# Patient Record
Sex: FEMALE | Race: WHITE | URBAN - METROPOLITAN AREA
[De-identification: names, ages, dates, MRNs, and addresses within clinical notes are randomized per-mention and may not be internally consistent; named-entity substitution may affect disease eponyms.]

---

## 2018-01-16 ENCOUNTER — HOSPITAL ENCOUNTER (OUTPATIENT)
Dept: PHYSICAL THERAPY | Age: 83
Discharge: HOME | End: 2018-01-31

## 2018-01-16 ASSESSMENT — PAIN DESCRIPTION - PROGRESSION: CLINICAL_PROGRESSION: NOT CHANGED

## 2018-01-16 ASSESSMENT — PAIN DESCRIPTION - FREQUENCY: FREQUENCY: INTERMITTENT

## 2018-01-16 ASSESSMENT — PAIN DESCRIPTION - ORIENTATION: ORIENTATION: LEFT

## 2018-01-16 ASSESSMENT — PAIN SCALES - GENERAL: PAINLEVEL_OUTOF10: 5

## 2018-01-16 ASSESSMENT — PAIN DESCRIPTION - PAIN TYPE: TYPE: CHRONIC PAIN

## 2018-01-16 ASSESSMENT — PAIN DESCRIPTION - LOCATION: LOCATION: KNEE

## 2018-01-16 NOTE — PROGRESS NOTES
impaired function, dec strength, dec safety  Prognosis: Good;Fair  Decision Making: Medium Complexity  History: PMH:  Including, but not limited to, OA, CVA (recent) and report of TIAs per son, A-fib, HTN, Lt knee OA  Exam: transfers, gait, LEFS, MMT  Clinical Presentation: moderate complexity - evolving  Patient Education: See daily note  Barriers to Learning: None noted today  REQUIRES PT FOLLOW UP: Yes  Treatment Initiated : See daily note  Discharge Recommendations: Continue to assess pending progress  Activity Tolerance  Activity Tolerance: Patient limited by fatigue  Activity Tolerance: Mildly SOB during gait (after approx 60 feet) requiring a standing rest break (chronic condition per pt)         Plan   Plan  Times per week: 1x/wk  Plan weeks: 6 weeks  Current Treatment Recommendations: Strengthening, ROM, Balance Training, Transfer Training, Functional Mobility Training, Stair training, Gait Training, Endurance Training, Manual Therapy - Soft Tissue Mobilization, Pain Management, Modalities, Patient/Caregiver Education & Training, Safety Education & Training, Home Exercise Program  Safety Devices  Type of devices: Gait belt, Patient at risk for falls (by observation)    G-Code  PT G-Codes  Functional Assessment Tool Used: LEFS  Score: 14/80   Functional Limitation: Mobility: Walking and moving around  Mobility: Walking and Moving Around Current Status (): At least 80 percent but less than 100 percent impaired, limited or restricted  Mobility: Walking and Moving Around Goal Status (): At least 20 percent but less than 40 percent impaired, limited or restricted    Goals  Long term goals  Time Frame for Long term goals : In 6 weeks or 6 visits, patient will  Long term goal 1: demonstrate compliance and independence w/HEP. Long term goal 2: score 64-49 or better on LEFS. Long term goal 3: ambulate household distances/surfaces w/LRAD Mod Ind.   Long term goal 4: demonstrate safe transfers 100% of the time w/o vc. Long term goal 5: ascend/descend 3 stairs, (1) HR prn light support w/supervision.        Therapy Time   Individual Concurrent Group Co-treatment   Time In 1540         Time Out 1634         Minutes 54         Timed Code Treatment Minutes: [address], PT

## 2018-01-16 NOTE — FLOWSHEET NOTE
w/gait      Plan for Next Session: Develop HEP  Gait and transfer training        Modality/intervention used:  [] Therapeutic Exercise  [] Modalities:  [x] Therapeutic Activity     [] Ultrasound  [] Elec  Stim  [x] Gait Training      [] Cervical Traction [] Lumbar Traction  [] Neuromuscular Re-education    [] Cold/hotpack [] Iontophoresis   [] Instruction in HEP      [] Vasopneumatic     [] Manual Therapy               [] Self care home management                    (    ) Dry needling    Post Tx Pain Ratin/10 left knee w/gait, otherwise 0/10    Plan:(Fequency/duration/# of visits)   1x/wk x 6 weeks    [x] Continue per plan of care [] Alter current plan   [x] Plan of care initiated [] Hold pending MD visit [] Discharge         Time In: 9124  Time Out:1635  Timed Code Treatment Minutes:  34  Total Treatment Minutes:  54    Electronically signed by:  Bekah Castañeda, PT 2018, 6:52 PM

## 2018-01-25 ENCOUNTER — HOSPITAL ENCOUNTER (OUTPATIENT)
Dept: MRI IMAGING | Age: 83
Discharge: HOME | End: 2018-01-25

## 2018-01-25 DIAGNOSIS — M25.562 PAIN IN LEFT KNEE: ICD-10-CM

## 2018-01-25 DIAGNOSIS — M25.562 ACUTE PAIN OF LEFT KNEE: ICD-10-CM

## 2018-01-25 DIAGNOSIS — S83.242A OTHER TEAR OF MEDIAL MENISCUS, CURRENT INJURY, LEFT KNEE, INITIAL ENCOUNTER: ICD-10-CM

## 2018-02-01 ENCOUNTER — HOSPITAL ENCOUNTER (OUTPATIENT)
Dept: OTHER | Age: 83
Discharge: HOME | End: 2018-02-28